# Patient Record
Sex: FEMALE | Race: WHITE | NOT HISPANIC OR LATINO
[De-identification: names, ages, dates, MRNs, and addresses within clinical notes are randomized per-mention and may not be internally consistent; named-entity substitution may affect disease eponyms.]

---

## 2021-08-24 ENCOUNTER — APPOINTMENT (OUTPATIENT)
Dept: THORACIC SURGERY | Facility: CLINIC | Age: 56
End: 2021-08-24
Payer: COMMERCIAL

## 2021-08-24 VITALS — WEIGHT: 245 LBS | BODY MASS INDEX: 35.07 KG/M2 | HEIGHT: 70 IN

## 2021-08-24 DIAGNOSIS — F17.200 NICOTINE DEPENDENCE, UNSPECIFIED, UNCOMPLICATED: ICD-10-CM

## 2021-08-24 DIAGNOSIS — Z78.9 OTHER SPECIFIED HEALTH STATUS: ICD-10-CM

## 2021-08-24 PROCEDURE — G0296 VISIT TO DETERM LDCT ELIG: CPT

## 2021-08-24 NOTE — ASSESSMENT
[Discussed Risks and Advised to Quit Smoking] : Discussed risks and advised to quit smoking [Pre-contemplation] : Pre-contemplation: The patient is not thinking about quitting smoking in the next 6 months [de-identified] : Will discuss smoking cessation during another phone call

## 2021-08-24 NOTE — PLAN
[Smoking Cessation] : smoking cessation [FreeTextEntry1] : Plan:\par \par -Low dose CT chest for lung cancer screening. Dr. Chacon has been CC'd in order for LDCT.\par \par -Follow up with patient and her referring provider after her LDCT results have been reviewed by the multidisciplinary clinical team, if needed.\par \par Will discuss smoking cessation in another phone call.\par \par Should I screen? tool utilized. 6 Year risk of lung cancer is   0.7%. Patient wishes to proceed with screening.\par \par Engaged in discussion regarding risks of screening during Covid-19 pandemic and precautions that are being used  to reduce exposure.\par \par Engaged in shared decision making with Ms. HWANG . Answered all questions. She verbalized understanding and agreement. She knows to call back with and questions or concerns.\par

## 2021-08-24 NOTE — HISTORY OF PRESENT ILLNESS
[Current] : current smoker [_____ pack-years] : [unfilled] pack-years [TextBox_13] : Referred by Dr. Chacon\par \par LORA HWANG had telephonic visit for a review of eligibility and discussion of the Low dose CT lung cancer screening program. A telephonic visit occurred due to the patient not having access to a smart phone or a computer for an audio/visual visit.  The following was reviewed and confirmed the patient meets screening eligibility criteria.\par -Age 56 year\par Smoking Status:\par -Current smoker\par -Number of pack(s) per day: 1- 1.5 PPD\par -Number of years smoked: 37\par -Number of pack years smokin.75\par \par Ms. HWANG   denies any signs or symptoms of lung cancer including new cough, changing cough, hemoptysis, and unintentional weight loss.\par Ms. HWANG  reports history of asthma and HTN. She denies any personal history of lung cancer. Reports no lung cancer in a 1st degree relative. Reports no lung cancer in a 2nd degree relative.  Denies any  history of  occupational exposures.\par

## 2021-08-24 NOTE — REASON FOR VISIT
[Home] : at home, [unfilled] , at the time of the visit. [Medical Office: ([facility])___] : at the medical office located in  [Verbal consent obtained from patient] : the patient, [unfilled] [Initial Evaluation] : an initial evaluation visit [Review of Eligibility] : review of eligibility [Low-Dose CT Screening Discussion] : low-dose CT lung cancer screening discussion

## 2021-09-14 ENCOUNTER — NON-APPOINTMENT (OUTPATIENT)
Age: 56
End: 2021-09-14

## 2022-01-08 ENCOUNTER — APPOINTMENT (OUTPATIENT)
Dept: AFTER HOURS CARE | Facility: EMERGENCY ROOM | Age: 57
End: 2022-01-08
Payer: COMMERCIAL

## 2022-01-08 ENCOUNTER — TRANSCRIPTION ENCOUNTER (OUTPATIENT)
Age: 57
End: 2022-01-08

## 2022-01-08 DIAGNOSIS — J45.909 UNSPECIFIED ASTHMA, UNCOMPLICATED: ICD-10-CM

## 2022-01-08 PROCEDURE — 99204 OFFICE O/P NEW MOD 45 MIN: CPT | Mod: 95

## 2022-01-08 RX ORDER — PREDNISONE 20 MG/1
20 TABLET ORAL
Qty: 11 | Refills: 0 | Status: ACTIVE | COMMUNITY
Start: 2022-01-08 | End: 1900-01-01

## 2022-01-23 NOTE — HISTORY OF PRESENT ILLNESS
[Home] : at home, [unfilled] , at the time of the visit. [Other Location: e.g. 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 Tried calling PMD office yesterday, no call back, States she needs steroid because she feels her asthma symptoms worsening. coughing with some tightness. Was taking mucinex and albuterol, now just taking albuterol. Went back to work on Wed and Thursday - no energy. Pulse ox is 99% on room air

## 2022-01-23 NOTE — PLAN
[FreeTextEntry1] : 1. Rx: prednisone burst\par 2. Continue albuterol\par 3. F/u PMD\par 4. Monitor for Red flag symptoms for immediate ER evaluation

## 2022-11-15 ENCOUNTER — APPOINTMENT (OUTPATIENT)
Dept: AFTER HOURS CARE | Facility: EMERGENCY ROOM | Age: 57
End: 2022-11-15

## 2022-11-15 DIAGNOSIS — J02.8 ACUTE PHARYNGITIS DUE TO OTHER SPECIFIED ORGANISMS: ICD-10-CM

## 2022-11-15 DIAGNOSIS — B97.89 ACUTE PHARYNGITIS DUE TO OTHER SPECIFIED ORGANISMS: ICD-10-CM

## 2022-11-15 PROCEDURE — 99214 OFFICE O/P EST MOD 30 MIN: CPT | Mod: 95

## 2022-11-15 RX ORDER — PREDNISONE 50 MG/1
50 TABLET ORAL DAILY
Qty: 5 | Refills: 0 | Status: ACTIVE | COMMUNITY
Start: 2022-11-15 | End: 1900-01-01

## 2022-11-15 RX ORDER — NAPROXEN 500 MG/1
500 TABLET ORAL
Qty: 10 | Refills: 0 | Status: ACTIVE | COMMUNITY
Start: 2022-11-15 | End: 1900-01-01

## 2022-11-15 NOTE — REVIEW OF SYSTEMS
[Fever] : fever [Chills] : no chills [Fatigue] : fatigue [Hoarseness] : hoarseness [Nasal Discharge] : nasal discharge [Sore Throat] : sore throat [Chest Pain] : no chest pain [Palpitations] : no palpitations [Lower Ext Edema] : no lower extremity edema [Shortness Of Breath] : no shortness of breath [Wheezing] : wheezing [Cough] : cough [Dyspnea on Exertion] : no dyspnea on exertion [Abdominal Pain] : no abdominal pain [Nausea] : no nausea [Diarrhea] : diarrhea [Vomiting] : no vomiting [Dysuria] : no dysuria [Hematuria] : no hematuria [Joint Pain] : no joint pain [Muscle Pain] : no muscle pain [Skin Rash] : no skin rash [Headache] : no headache

## 2022-11-15 NOTE — ASSESSMENT
[FreeTextEntry1] : 57y F w/ pMHx Asthma, HTN presenting with five days of sore throat, hoarse voice, low-grade fever, cough and bodyaches. Patient as well appearing, speaking in full and clear sentences, no appearance of respiratory distress. Posterior pharynx erythematous without exudates. Patient would not like to be started on Paxlovid. Endorses mild wheezing secondary to her asthma has been using her inhaler as directed. Like the viral ideology secondary to Covid with positive sick contact at home, will prescribe stress dose steroids, naproxen for throat pain, recommend tea with honey, hydration and rest, and close follow up with primary care doctor.

## 2022-11-15 NOTE — HISTORY OF PRESENT ILLNESS
[Home] : at home, [unfilled] , at the time of the visit. [Other Location: e.g. Home (Enter Location, City,State)___] : at [unfilled] [Verbal consent obtained from patient] : the patient, [unfilled] [FreeTextEntry8] : 57y F w/ pMHx Asthma, HTN presenting with five days of sore throat, hoarse voice, low-grade fever, cough and bodyaches and mild wheezing. Has been using her symicort and rescue inhaler. States her son has been at home sick with Covid, took a test this morning and noticed on the rapid to faint lines. Patient said she had Covid back in December 2021. States she's able to tolerate PO, denies difficulty breathing denies chest pain denies nausea, vomiting, diarrhea, urinary symptoms, rash. Has been taking Tylenol and Motrin but states her sore throat has become more severe and exacerbated by coughing. Patient is a nurse. Denies exudates to posterior throat. Is Covid vaccinated.

## 2022-11-15 NOTE — PHYSICAL EXAM
[No Acute Distress] : no acute distress [Well-Appearing] : well-appearing [Normal Voice/Communication] : normal voice/communication [Normal Sclera/Conjunctiva] : normal sclera/conjunctiva [EOMI] : extraocular movements intact [Normal Outer Ear/Nose] : the outer ears and nose were normal in appearance [No Respiratory Distress] : no respiratory distress  [No Accessory Muscle Use] : no accessory muscle use [Soft] : abdomen soft [Non Tender] : non-tender [No Joint Swelling] : no joint swelling [No Rash] : no rash [Speech Grossly Normal] : speech grossly normal [de-identified] : +erythematous posterior pharynx

## 2024-03-25 ENCOUNTER — APPOINTMENT (OUTPATIENT)
Dept: THORACIC SURGERY | Facility: CLINIC | Age: 59
End: 2024-03-25
Payer: COMMERCIAL

## 2024-03-25 VITALS — BODY MASS INDEX: 34.65 KG/M2 | HEIGHT: 70 IN | WEIGHT: 242 LBS

## 2024-03-25 DIAGNOSIS — F17.210 NICOTINE DEPENDENCE, CIGARETTES, UNCOMPLICATED: ICD-10-CM

## 2024-03-25 PROCEDURE — G0296 VISIT TO DETERM LDCT ELIG: CPT

## 2024-03-25 NOTE — PLAN
[Smoking Cessation Guidance Provided] : Smoking cessation guidance was provided to patient [Smoking Cessation] : smoking cessation [FreeTextEntry1] : Plan:  -Low dose CT chest for lung cancer screening. Ana Del Cid ordered the low dose CT.       -Follow up with  her referring provider after her LDCT results have been reviewed by the multidisciplinary clinical team, if needed.   -Encouraged smoking cessation.   -Patient declines referral to CTC and CCX   Should I screen? tool utilized. 6 Year risk of lung cancer is  1.6 %.   Patient wishes to proceed with screening.  Engaged in discussion regarding risks of screening during Covid-19 pandemic and precautions that are being used  to reduce exposure.  Engaged in shared decision making with Ms. ERI . Answered all questions. She verbalized understanding and agreement. She knows to call back with and questions or concerns.

## 2024-03-25 NOTE — ASSESSMENT
[Ready] : Patient is ready for cessation intervention [Other smokers in household] : other smokers in household [Preparation] : Preparation: The patient is getting ready to quit smoking [de-identified] : Acknowledged how  difficult it is to quit smoking. Advised that quitting smoking is the most important thing a person can do for their health. Discussed strategies to deal with cravings. Suggested keeping a log of cigarette use and the triggers in an effort to identify triggers and break routines/habits. Suggested writing down time of each cigarette and attempting to delay the next cigarette. Discussed the importance of having a strategy planned in advance of quit date. She is trying to wean done to non and is in conversation with her  who also smokes. She agrees to contact writer at any poiunt  she would like more information on cessation.

## 2024-03-25 NOTE — HISTORY OF PRESENT ILLNESS
[TextBox_13] : Referred by Dr. Oswaldo MORAYAO had telephonic visit for a review of eligibility and discussion of the Low dose CT lung cancer screening program. A telephonic visit occurred due to the patient not having access to a smart phone or a computer for an audio/visual visit. The following was reviewed and confirmed the patient meets screening eligibility criteria.  Smoking Status: -Current smoker -Number of pack(s) per day: 1- 1.5 PPD for 40 years, now trying to wean down is smoking close to 1 pack.  -Number of pack years smokin  Ms. HWANG denies any signs or symptoms of lung cancer including new cough, changing cough, hemoptysis, and unintentional weight loss.  Ms. HWANG reports history of asthma and HTN. Beginning in early March upper back pain and lung "tightness" was seen in ER, had CXR and was noted that WBC were elevated "almost 15,000". She started Levaquin, has 3 doses left. Reports feeling much better. Two episodes of Covid 19 last , mild symptoms.  She denies DX of connective tissue disease and any personal history of cancer.  Reports no lung cancer in a 1st degree relative. Reports no lung cancer in a 2nd degree relative. Has exposure to 2nd hand smoke.  Denies any history of occupational exposures.   [Current] : Current [PacksperYear] : 50

## 2025-05-23 ENCOUNTER — NON-APPOINTMENT (OUTPATIENT)
Age: 60
End: 2025-05-23

## 2025-08-05 ENCOUNTER — NON-APPOINTMENT (OUTPATIENT)
Age: 60
End: 2025-08-05